# Patient Record
Sex: MALE | Race: WHITE
[De-identification: names, ages, dates, MRNs, and addresses within clinical notes are randomized per-mention and may not be internally consistent; named-entity substitution may affect disease eponyms.]

---

## 2014-08-27 VITALS
SYSTOLIC BLOOD PRESSURE: 130 MMHG | DIASTOLIC BLOOD PRESSURE: 80 MMHG | SYSTOLIC BLOOD PRESSURE: 130 MMHG | DIASTOLIC BLOOD PRESSURE: 80 MMHG

## 2017-05-04 ENCOUNTER — HOSPITAL ENCOUNTER (OUTPATIENT)
Dept: HOSPITAL 24 - RAD | Age: 72
End: 2017-05-04
Attending: INTERNAL MEDICINE
Payer: COMMERCIAL

## 2017-05-04 DIAGNOSIS — G31.84: Primary | ICD-10-CM

## 2017-05-04 DIAGNOSIS — E29.1: ICD-10-CM

## 2017-05-04 LAB
BUN SERPL-MCNC: 7 MG/DL (ref 7–18)
CREAT SERPL-MCNC: 1.02 MG/DL (ref 0.7–1.3)

## 2017-05-04 PROCEDURE — 82565 ASSAY OF CREATININE: CPT

## 2017-05-04 PROCEDURE — 36415 COLL VENOUS BLD VENIPUNCTURE: CPT

## 2017-05-04 PROCEDURE — 70553 MRI BRAIN STEM W/O & W/DYE: CPT

## 2017-05-04 PROCEDURE — 84520 ASSAY OF UREA NITROGEN: CPT

## 2017-05-04 NOTE — MRI
STUDY: MRI OF THE BRAIN WITHOUT AND WITH GADOLINIUM



HISTORY:  Mild cognitive impairment. Altered mental status. Forgetfulness.



Technique: Multiplanar multi-sequence MRI of the brain was obtained utilizing standard departmental 
protocol. Sagittal and axial T1, axial T2, FLAIR, diffusion (DWI/ADC) images through the brain were 
performed. 



18 cc of Omniscan was administered intravenously without reported complication following acquisition
 of informed written consent. Post gadolinium axial and coronal T1 weighted images were also perform
ed and reviewed.



Comparison: Head CT dated August 26, 2014.



Findings:



Pre gadolinium brain: The sulci, cisterns and ventricles are prominent consistent with diffuse volum
e loss. There are scattered foci of T2 prolongation in the periventricular and subcortical white mat
ter of both hemispheres. This is a nonspecific finding which likely represents mild microangiopathic
 change in a patient of this age. 



There is no evidence of acute territorial infarction, hemorrhage, mass, mass effect, or midline shif
t. There are no abnormal intra-axial or extra-axial fluid collections.



The major intracranial vascular flow voids appear intact. The left vertebral artery is dominant. The
re is bilateral aphakia.  



Post gadolinium brain: Following the uneventful administration of intravenous gadolinium, there is n
o evidence of abnormal parenchymal or leptomeningeal enhancement.



IMPRESSION:

 

1.  No evidence of acute intracranial abnormality.



2.  Nonspecific white matter change and volume loss.







Reported By:Electronically Signed by HUAN WOLFE MD at 5/4/2017 2:49:18 PM

## 2018-04-02 ENCOUNTER — HOSPITAL ENCOUNTER (OUTPATIENT)
Dept: HOSPITAL 24 - LAB | Age: 73
End: 2018-04-02
Attending: INTERNAL MEDICINE
Payer: COMMERCIAL

## 2018-04-02 DIAGNOSIS — B96.89: ICD-10-CM

## 2018-04-02 DIAGNOSIS — R19.7: Primary | ICD-10-CM

## 2018-04-02 LAB
C PARVUM AG STL QL IA: NEGATIVE
G LAMBLIA AG STL QL IA: NEGATIVE
STOOL FOR WBC: NEGATIVE

## 2018-04-02 PROCEDURE — 87328 CRYPTOSPORIDIUM AG IA: CPT

## 2018-04-02 PROCEDURE — 82274 ASSAY TEST FOR BLOOD FECAL: CPT

## 2018-04-02 PROCEDURE — 87427 SHIGA-LIKE TOXIN AG IA: CPT

## 2018-04-02 PROCEDURE — 87045 FECES CULTURE AEROBIC BACT: CPT

## 2018-04-02 PROCEDURE — 87336 ENTAMOEB HIST DISPR AG IA: CPT

## 2018-04-02 PROCEDURE — 87449 NOS EACH ORGANISM AG IA: CPT

## 2018-04-02 PROCEDURE — 87329 GIARDIA AG IA: CPT

## 2018-04-02 PROCEDURE — 87493 C DIFF AMPLIFIED PROBE: CPT

## 2018-04-02 PROCEDURE — 83630 LACTOFERRIN FECAL (QUAL): CPT

## 2018-05-17 ENCOUNTER — HOSPITAL ENCOUNTER (OUTPATIENT)
Dept: HOSPITAL 24 - SURG1 | Age: 73
Discharge: HOME | End: 2018-05-17
Attending: INTERNAL MEDICINE
Payer: COMMERCIAL

## 2018-05-17 VITALS — SYSTOLIC BLOOD PRESSURE: 100 MMHG | DIASTOLIC BLOOD PRESSURE: 56 MMHG

## 2018-05-17 DIAGNOSIS — Z86.010: ICD-10-CM

## 2018-05-17 DIAGNOSIS — R19.4: ICD-10-CM

## 2018-05-17 DIAGNOSIS — R10.31: ICD-10-CM

## 2018-05-17 DIAGNOSIS — R10.32: ICD-10-CM

## 2018-05-17 DIAGNOSIS — D12.3: ICD-10-CM

## 2018-05-17 DIAGNOSIS — D12.0: ICD-10-CM

## 2018-05-17 DIAGNOSIS — Z12.11: Primary | ICD-10-CM

## 2018-05-17 DIAGNOSIS — K63.5: ICD-10-CM

## 2018-05-17 DIAGNOSIS — K57.30: ICD-10-CM

## 2018-05-17 DIAGNOSIS — D12.2: ICD-10-CM

## 2018-05-17 DIAGNOSIS — K64.0: ICD-10-CM

## 2018-05-17 PROCEDURE — A4217 STERILE WATER/SALINE, 500 ML: HCPCS

## 2018-05-17 PROCEDURE — 0DBL8ZX EXCISION OF TRANSVERSE COLON, VIA NATURAL OR ARTIFICIAL OPENING ENDOSCOPIC, DIAGNOSTIC: ICD-10-PCS | Performed by: INTERNAL MEDICINE

## 2018-05-17 PROCEDURE — 0DBK8ZX EXCISION OF ASCENDING COLON, VIA NATURAL OR ARTIFICIAL OPENING ENDOSCOPIC, DIAGNOSTIC: ICD-10-PCS | Performed by: INTERNAL MEDICINE

## 2018-05-17 PROCEDURE — 99100 ANES PT EXTEME AGE<1 YR&>70: CPT

## 2018-05-17 PROCEDURE — 0DJD8ZZ INSPECTION OF LOWER INTESTINAL TRACT, VIA NATURAL OR ARTIFICIAL OPENING ENDOSCOPIC: ICD-10-PCS | Performed by: INTERNAL MEDICINE

## 2018-05-17 PROCEDURE — 0DBH8ZX EXCISION OF CECUM, VIA NATURAL OR ARTIFICIAL OPENING ENDOSCOPIC, DIAGNOSTIC: ICD-10-PCS | Performed by: INTERNAL MEDICINE
